# Patient Record
Sex: FEMALE | Race: WHITE | URBAN - METROPOLITAN AREA
[De-identification: names, ages, dates, MRNs, and addresses within clinical notes are randomized per-mention and may not be internally consistent; named-entity substitution may affect disease eponyms.]

---

## 2017-06-20 ENCOUNTER — OFFICE VISIT (OUTPATIENT)
Dept: OBGYN | Facility: CLINIC | Age: 27
End: 2017-06-20
Payer: COMMERCIAL

## 2017-06-20 VITALS
SYSTOLIC BLOOD PRESSURE: 110 MMHG | WEIGHT: 125 LBS | BODY MASS INDEX: 20.09 KG/M2 | HEIGHT: 66 IN | DIASTOLIC BLOOD PRESSURE: 76 MMHG

## 2017-06-20 DIAGNOSIS — Z01.419 ENCOUNTER FOR GYNECOLOGICAL EXAMINATION WITHOUT ABNORMAL FINDING: Primary | ICD-10-CM

## 2017-06-20 DIAGNOSIS — Z30.45 ENCOUNTER FOR SURVEILLANCE OF TRANSDERMAL PATCH HORMONAL CONTRACEPTIVE DEVICE: ICD-10-CM

## 2017-06-20 DIAGNOSIS — F98.8 ADD (ATTENTION DEFICIT DISORDER): ICD-10-CM

## 2017-06-20 PROCEDURE — G0145 SCR C/V CYTO,THINLAYER,RESCR: HCPCS | Performed by: NURSE PRACTITIONER

## 2017-06-20 PROCEDURE — 99385 PREV VISIT NEW AGE 18-39: CPT | Performed by: NURSE PRACTITIONER

## 2017-06-20 RX ORDER — DEXTROAMPHETAMINE/AMPHETAMINE 10 MG
CAPSULE, EXT RELEASE 24 HR ORAL
Qty: 90 CAPSULE | Refills: 0 | Status: SHIPPED | OUTPATIENT
Start: 2017-06-20 | End: 2018-05-07

## 2017-06-20 RX ORDER — DEXTROAMPHETAMINE/AMPHETAMINE 10 MG
CAPSULE, EXT RELEASE 24 HR ORAL
Refills: 0 | COMMUNITY
Start: 2017-04-20 | End: 2017-06-20

## 2017-06-20 RX ORDER — SPIRONOLACTONE 50 MG/1
TABLET, FILM COATED ORAL
Refills: 1 | COMMUNITY
Start: 2017-06-16

## 2017-06-20 RX ORDER — NORELGESTROMIN AND ETHINYL ESTRADIOL 150; 35 UG/D; UG/D
PATCH TRANSDERMAL
Refills: 4 | COMMUNITY
Start: 2017-06-01 | End: 2017-06-20

## 2017-06-20 RX ORDER — MINOCYCLINE HYDROCHLORIDE 100 MG/1
CAPSULE ORAL
Refills: 2 | COMMUNITY
Start: 2017-05-18 | End: 2017-06-20

## 2017-06-20 RX ORDER — NORELGESTROMIN AND ETHINYL ESTRADIOL 150; 35 UG/D; UG/D
PATCH TRANSDERMAL
Qty: 9 PATCH | Refills: 4 | Status: SHIPPED | OUTPATIENT
Start: 2017-06-20

## 2017-06-20 ASSESSMENT — ANXIETY QUESTIONNAIRES
GAD7 TOTAL SCORE: 3
3. WORRYING TOO MUCH ABOUT DIFFERENT THINGS: SEVERAL DAYS
IF YOU CHECKED OFF ANY PROBLEMS ON THIS QUESTIONNAIRE, HOW DIFFICULT HAVE THESE PROBLEMS MADE IT FOR YOU TO DO YOUR WORK, TAKE CARE OF THINGS AT HOME, OR GET ALONG WITH OTHER PEOPLE: SOMEWHAT DIFFICULT
7. FEELING AFRAID AS IF SOMETHING AWFUL MIGHT HAPPEN: NOT AT ALL
1. FEELING NERVOUS, ANXIOUS, OR ON EDGE: NOT AT ALL
2. NOT BEING ABLE TO STOP OR CONTROL WORRYING: SEVERAL DAYS
5. BEING SO RESTLESS THAT IT IS HARD TO SIT STILL: NOT AT ALL
6. BECOMING EASILY ANNOYED OR IRRITABLE: SEVERAL DAYS

## 2017-06-20 ASSESSMENT — PATIENT HEALTH QUESTIONNAIRE - PHQ9: 5. POOR APPETITE OR OVEREATING: NOT AT ALL

## 2017-06-20 NOTE — LETTER
July 3, 2017      Anahy Fernandez  [address]  [place] [address]    Dear ,      I am happy to inform you that your recent cervical cancer screening test (PAP smear) was normal.      Preventative screenings such as this help to ensure your health for years to come. You should repeat a pap smear in 1 year, unless otherwise directed.      You will still need to return to the clinic every year for your annual exam and other preventive tests.     Please contact the clinic at [phone] if you have further questions.       Sincerely,      Kelli Thomas, APRN CNP/rlm

## 2017-06-20 NOTE — PROGRESS NOTES
Anahy is a 27 year old No obstetric history on file. female who presents for annual exam.     Besides routine health maintenance, she has no other health concerns today .    HPI:  The patient's PCP is  NONE. She is feeling well. No concerns. She is building a home in [place] and getting  in August. She is on the contraceptive patches, her menses is lasting 5-7 days, heavy for about 1-2 days then tapering down. She likes the patches and wishes to continue.      GYNECOLOGIC HISTORY:    Patient's last menstrual period was 2017 (exact date).  Her current contraception method is: Patch.  She  reports that she has never smoked. She has never used smokeless tobacco.    Patient is sexually active.  STD testing offered?  Declined  Last PHQ-9 score on record =   PHQ-9 SCORE 2017   Total Score 0     Last GAD7 score on record =   CLAUDIA-7 SCORE 2017   Total Score 3     Alcohol Score = 4    HEALTH MAINTENANCE:  Cholesterol: Never  Last Mammo: Never, Result: not applicable, Next Mammo: Age 39   Pap: 2016  Colonoscopy: Never, Result: not applicable, Next Colonoscopy: Age 49  Dexa: Never    Health maintenance updated:  yes    HISTORY:  Obstetric History       T0      L0     SAB0   TAB0   Ectopic0   Multiple0   Live Births0           There is no problem list on file for this patient.    Past Surgical History:   Procedure Laterality Date     AS REPAIR CRUCIATE LIGAMENT,KNEE        Social History   Substance Use Topics     Smoking status: Never Smoker     Smokeless tobacco: Never Used     Alcohol use Yes      Problem (# of Occurrences) Relation (Name,Age of Onset)    Breast Cancer (1) Maternal Aunt    Hypertension (2) Mother, Maternal Grandmother    Lung Cancer (1) Maternal Grandmother            Current Outpatient Prescriptions   Medication Sig     spironolactone (ALDACTONE) 50 MG tablet TK 1 T PO BID     CLONAZEPAM PO      XULANE 150-35 MCG/24HR patch APPLY 1 PATCH BY TRANSDERMAL ROUTE  "ONCE WEEKLY FOR 3 WEEKS     ADDERALL XR 10 MG per 24 hr capsule TAKE 1 CAPSULE BY MOUTH ONCE DAILY     [DISCONTINUED] XULANE 150-35 MCG/24HR patch **FILL 7/23**APPLY 1 PATCH BY TRANSDERMAL ROUTE ONCE WEEKLY FOR 3 WEEKS     No current facility-administered medications for this visit.      No Known Allergies    Past medical, surgical, social and family histories were reviewed and updated in EPIC.    ROS:   12 point review of systems negative other than symptoms noted below.  Psychiatric: Anxiety    EXAM:  /76  Ht 5' 5.5\" (1.664 m)  Wt 125 lb (56.7 kg)  LMP 06/19/2017 (Exact Date)  Breastfeeding? No  BMI 20.48 kg/m2   BMI: Body mass index is 20.48 kg/(m^2).    PHYSICAL EXAM:  Constitutional:  Appearance: Well nourished, well developed, alert, in no acute distress  Neck:  Lymph Nodes:  No lymphadenopathy present    Thyroid:  Gland size normal, nontender, no nodules or masses present  on palpation  Chest:  Respiratory Effort:  Breathing unlabored  Cardiovascular:    Heart: Auscultation:  Regular rate, normal rhythm, no murmurs present  Breasts: Inspection of Breasts:  No lymphadenopathy present    Palpation of Breasts and Axillae:  No masses present on palpation, no  breast tenderness    Axillary Lymph Nodes:  No lymphadenopathy present  Gastrointestinal:   Abdominal Examination:  Abdomen nontender to palpation, tone normal without rigidity or guarding, no masses present, umbilicus without lesions   Liver and Spleen:  No hepatomegaly present, liver nontender to palpation    Hernias:  No hernias present  Lymphatic: Lymph Nodes:  No other lymphadenopathy present  Skin:  General Inspection:  No rashes present, no lesions present, no areas of  discoloration    Genitalia and Groin:  No rashes present, no lesions present, no areas of  discoloration, no masses present  Neurologic/Psychiatric:    Mental Status:  Oriented X3     Pelvic Exam:  External Genitalia:     Normal appearance for age, no discharge present, no " tenderness present, no inflammatory lesions present, color normal  Vagina:     Normal vaginal vault without central or paravaginal defects, no discharge present, no inflammatory lesions present, no masses present  Bladder:     Nontender to palpation  Urethra:   Urethral Body:  Urethra palpation normal, urethra structural support normal   Urethral Meatus:  No erythema or lesions present  Cervix:     Appearance healthy, no lesions present, nontender to palpation, no bleeding present  Uterus:     Uterus: firm, normal sized and nontender, anteverted in position.   Adnexa:     No adnexal tenderness present, no adnexal masses present  Perineum:     Perineum within normal limits, no evidence of trauma, no rashes or skin lesions present  Anus:     Anus within normal limits, no hemorrhoids present  Inguinal Lymph Nodes:     No lymphadenopathy present  Pubic Hair:     Normal pubic hair distribution for age  Genitalia and Groin:     No rashes present, no lesions present, no areas of discoloration, no masses present    COUNSELING:   Special attention given to:        Regular exercise       Healthy diet/nutrition       Contraception       Safe sex practices/STD prevention    BMI: Body mass index is 20.48 kg/(m^2).      ASSESSMENT:  27 year old female with satisfactory annual exam.    ICD-10-CM    1. Encounter for gynecological examination without abnormal finding Z01.419 Pap imaged thin layer screen reflex to HPV if ASCUS - recommended age 25 - 29 years   2. Encounter for surveillance of transdermal patch hormonal contraceptive device Z30.45 XULANE 150-35 MCG/24HR patch   3. ADD (attention deficit disorder) F98.8 ADDERALL XR 10 MG per 24 hr capsule       PLAN:  BP elevated but better on recheck, ok for patches x1 year. adderall rx given. Pap smears annually due to abnormal in past.     BARRY Fernandez CNP

## 2017-06-20 NOTE — MR AVS SNAPSHOT
"              After Visit Summary   6/20/2017    Anahy Fernandez    MRN: [id number]           Patient Information     Date Of Birth          1990        Visit Information        Provider Department      6/20/2017 7:30 AM Kelli Thomas APRN CNP [facility]        Today's Diagnoses     Encounter for gynecological examination without abnormal finding    -  1    Encounter for surveillance of transdermal patch hormonal contraceptive device        ADD (attention deficit disorder)           Follow-ups after your visit        Follow-up notes from your care team     Return in about 1 year (around 6/20/2018).      Who to contact     If you have questions or need follow up information about today's clinic visit or your schedule please contact [facility] directly at [phone].  Normal or non-critical lab and imaging results will be communicated to you by [employer], letter or phone within 4 business days after the clinic has received the results. If you do not hear from us within 7 days, please contact the clinic through [employer] or phone. If you have a critical or abnormal lab result, we will notify you by phone as soon as possible.  Submit refill requests through [employer] or call your pharmacy and they will forward the refill request to us. Please allow 3 business days for your refill to be completed.          Additional Information About Your Visit        MyChart Information     [employer] lets you send messages to your doctor, view your test results, renew your prescriptions, schedule appointments and more. To sign up, go to [email or web address] . Click on \"Log in\" on the left side of the screen, which will take you to the Welcome page. Then click on \"Sign up Now\" on the right side of the page.     You will be asked to enter the access code listed below, as well as some personal information. Please follow the directions to create your username and password.     Your access " "code is: [email or web address]  Expires: 2017  7:23 AM     Your access code will  in 90 days. If you need help or a new code, please call your [facility] clinic or [phone].        Care EveryWhere ID     This is your Care EveryWhere ID. This could be used by other organizations to access your [facility] medical records  [id number]        Your Vitals Were     Height Last Period Breastfeeding? BMI (Body Mass Index)          5' 5.5\" (1.664 m) 2017 (Exact Date) No 20.48 kg/m2         Blood Pressure from Last 3 Encounters:   17 110/76    Weight from Last 3 Encounters:   17 125 lb (56.7 kg)              We Performed the Following     Pap imaged thin layer screen reflex to HPV if ASCUS - recommended age 25 - 29 years          Today's Medication Changes          These changes are accurate as of: 17  8:24 AM.  If you have any questions, ask your nurse or doctor.               These medicines have changed or have updated prescriptions.        Dose/Directions    ADDERALL XR 10 MG per 24 hr capsule   This may have changed:  See the new instructions.   Used for:  ADD (attention deficit disorder)   Generic drug:  amphetamine-dextroamphetamine   Changed by:  Kelli Thomas APRN CNP        TAKE 1 CAPSULE BY MOUTH ONCE DAILY   Quantity:  90 capsule   Refills:  0       XULANE 150-35 MCG/24HR patch   This may have changed:  See the new instructions.   Used for:  Encounter for surveillance of transdermal patch hormonal contraceptive device   Generic drug:  norelgestromin-ethinyl estradiol   Changed by:  Kelli Thomas APRN CNP        APPLY 1 PATCH BY TRANSDERMAL ROUTE ONCE WEEKLY FOR 3 WEEKS   Quantity:  9 patch   Refills:  4            Where to get your medicines      These medications were sent to [facility] [address] [place], [place] - [address]  [address], [place] [address]     Phone:  [phone]     XULANE 150-35 MCG/24HR patch         Some of these will need a paper " prescription and others can be bought over the counter.  Ask your nurse if you have questions.     Bring a paper prescription for each of these medications     ADDERALL XR 10 MG per 24 hr capsule                Primary Care Provider    None Specified       No primary provider on file.        Thank you!     Thank you for choosing [facility]  for your care. Our goal is always to provide you with excellent care. Hearing back from our patients is one way we can continue to improve our services. Please take a few minutes to complete the written survey that you may receive in the mail after your visit with us. Thank you!             Your Updated Medication List - Protect others around you: Learn how to safely use, store and throw away your medicines at www.disposemymeds.org.          This list is accurate as of: 6/20/17  8:24 AM.  Always use your most recent med list.                   Brand Name Dispense Instructions for use    ADDERALL XR 10 MG per 24 hr capsule   Generic drug:  amphetamine-dextroamphetamine     90 capsule    TAKE 1 CAPSULE BY MOUTH ONCE DAILY       CLONAZEPAM PO          spironolactone 50 MG tablet    ALDACTONE     TK 1 T PO BID       XULANE 150-35 MCG/24HR patch   Generic drug:  norelgestromin-ethinyl estradiol     9 patch    APPLY 1 PATCH BY TRANSDERMAL ROUTE ONCE WEEKLY FOR 3 WEEKS

## 2017-06-21 ASSESSMENT — PATIENT HEALTH QUESTIONNAIRE - PHQ9: SUM OF ALL RESPONSES TO PHQ QUESTIONS 1-9: 0

## 2017-06-21 ASSESSMENT — ANXIETY QUESTIONNAIRES: GAD7 TOTAL SCORE: 3

## 2017-06-22 LAB
COPATH REPORT: NORMAL
PAP: NORMAL

## 2018-05-07 ENCOUNTER — TELEPHONE (OUTPATIENT)
Dept: OBGYN | Facility: CLINIC | Age: 28
End: 2018-05-07

## 2018-05-07 DIAGNOSIS — F90.0 ATTENTION DEFICIT HYPERACTIVITY DISORDER (ADHD), PREDOMINANTLY INATTENTIVE TYPE: ICD-10-CM

## 2018-05-07 DIAGNOSIS — F41.9 ANXIETY: Primary | ICD-10-CM

## 2018-05-07 NOTE — TELEPHONE ENCOUNTER
ADDERALL XR 10 MG per 24 hr capsule    Last Written Prescription Date:  6/20/17  Last Fill Quantity: 90,   # refills: 0  Last Office Visit with [facility] primary care provider:  6/20/17  Future Office visit: none    Routing refill request to provider for review/approval because:  Drug not on the [facility], [facility] or [facility] refill protocol or controlled substance. Pt does not naveen everyday only takes as needed.     CLONAZEPAM       Last Written Prescription Date:  unknown  Last Fill Quantity: unknown,   # refills: unknow  Last Office Visit with [facility] primary care provider:  6/20/17  Future Office visit: none    Routing refill request to provider for review/approval because:  Pt is wondering if Kelli Thomas would refill this med. Pt does not know how much she takes. Only takes as needed.   Routing to Kelli Thomas. Please review.

## 2018-05-08 RX ORDER — DEXTROAMPHETAMINE/AMPHETAMINE 10 MG
CAPSULE, EXT RELEASE 24 HR ORAL
Qty: 90 CAPSULE | Refills: 0 | Status: SHIPPED | OUTPATIENT
Start: 2018-05-08

## 2018-05-08 RX ORDER — CLONAZEPAM 0.5 MG/1
0.25-0.5 TABLET ORAL 2 TIMES DAILY PRN
Qty: 20 TABLET | Refills: 0 | Status: SHIPPED | OUTPATIENT
Start: 2018-05-08